# Patient Record
Sex: FEMALE | Race: ASIAN | Employment: UNEMPLOYED | URBAN - METROPOLITAN AREA
[De-identification: names, ages, dates, MRNs, and addresses within clinical notes are randomized per-mention and may not be internally consistent; named-entity substitution may affect disease eponyms.]

---

## 2017-07-26 RX ORDER — ASPIRIN 81 MG/1
81 TABLET ORAL DAILY
COMMUNITY

## 2017-07-27 ENCOUNTER — HOSPITAL ENCOUNTER (OUTPATIENT)
Facility: HOSPITAL | Age: 57
Setting detail: HOSPITAL OUTPATIENT SURGERY
Discharge: HOME | End: 2017-07-27
Attending: ORTHOPAEDIC SURGERY | Admitting: ORTHOPAEDIC SURGERY
Payer: MEDICAID

## 2017-07-27 ENCOUNTER — SURGERY (OUTPATIENT)
Age: 57
End: 2017-07-27

## 2017-07-27 VITALS
OXYGEN SATURATION: 98 % | BODY MASS INDEX: 23.47 KG/M2 | TEMPERATURE: 99 F | HEART RATE: 73 BPM | DIASTOLIC BLOOD PRESSURE: 87 MMHG | RESPIRATION RATE: 14 BRPM | HEIGHT: 63.5 IN | WEIGHT: 134.13 LBS | SYSTOLIC BLOOD PRESSURE: 151 MMHG

## 2017-07-27 PROCEDURE — 88305 TISSUE EXAM BY PATHOLOGIST: CPT | Performed by: ORTHOPAEDIC SURGERY

## 2017-07-27 PROCEDURE — 0JBJ0ZZ EXCISION OF RIGHT HAND SUBCUTANEOUS TISSUE AND FASCIA, OPEN APPROACH: ICD-10-PCS | Performed by: ORTHOPAEDIC SURGERY

## 2017-07-27 PROCEDURE — 3E0T3BZ INTRODUCTION OF ANESTHETIC AGENT INTO PERIPHERAL NERVES AND PLEXI, PERCUTANEOUS APPROACH: ICD-10-PCS | Performed by: ORTHOPAEDIC SURGERY

## 2017-07-27 PROCEDURE — 0HQFXZZ REPAIR RIGHT HAND SKIN, EXTERNAL APPROACH: ICD-10-PCS | Performed by: ORTHOPAEDIC SURGERY

## 2017-07-27 RX ORDER — BUPIVACAINE HYDROCHLORIDE 5 MG/ML
INJECTION, SOLUTION EPIDURAL; INTRACAUDAL AS NEEDED
Status: DISCONTINUED | OUTPATIENT
Start: 2017-07-27 | End: 2017-07-27 | Stop reason: HOSPADM

## 2017-07-27 RX ORDER — ACETAMINOPHEN AND CODEINE PHOSPHATE 300; 30 MG/1; MG/1
1-2 TABLET ORAL EVERY 6 HOURS PRN
Qty: 8 TABLET | Refills: 1 | Status: SHIPPED | OUTPATIENT
Start: 2017-07-27 | End: 2019-08-07 | Stop reason: ALTCHOICE

## 2017-07-27 NOTE — BRIEF OP NOTE
Pre-Operative Diagnosis: RIGHT HAND MASS     Post-Operative Diagnosis: RIGHT HAND MASS     Procedure Performed:   Procedure(s):  RIGHT HAND MASS EXCISION    Surgeon(s) and Role:     Gerda Gonzalez MD - Primary    Assistant(s):        Surgical Findings

## 2017-07-27 NOTE — H&P
[address] Patient Status:  [facility]    1960 MRN [id number]   Location [address] [place] [address] Attending Tiffanie Kang MD   Hosp Day # 0 PCP Isabel Holbrook MD approximately 3mm and react to 2mm with reaction to light. Oropharynx is clear. Neck: No tenderness to palpitation. Full range of motion to flexion and extension, lateral rotation and lateral flexion of cervical spine. No JVD. Supple.    Lungs: Clear to

## 2017-07-28 NOTE — OPERATIVE REPORT
[address]    PATIENT'S NAME: Rosalie Delbarton   ATTENDING PHYSICIAN: William Santos M.D. OPERATING PHYSICIAN: William Santos M.D.    PATIENT ACCOUNT#:   [de-identified]    LOCATION:  PREOPASCC  PRE ASCC 15 EDWP 10  MEDICAL RECORD #:   [id number]       RYLEY site, and procedure were verified. The mass and an ellipse that was oriented in the radial to ulnar essentially transverse orientation was performed. The mass did not appear to extend deep to the dermal tissue.   There is no involvement of the flexor tend

## 2017-08-07 PROBLEM — R22.31 MASS OF FINGER OF RIGHT HAND: Status: ACTIVE | Noted: 2017-08-07

## 2018-11-05 PROBLEM — M65.331 TRIGGER MIDDLE FINGER OF RIGHT HAND: Status: ACTIVE | Noted: 2018-11-05

## 2019-02-16 ENCOUNTER — TELEPHONE (OUTPATIENT)
Dept: OPHTHALMOLOGY | Age: 59
End: 2019-02-16

## 2019-02-20 ENCOUNTER — OFFICE VISIT (OUTPATIENT)
Dept: OPHTHALMOLOGY | Age: 59
End: 2019-02-20

## 2019-02-20 DIAGNOSIS — H11.31 SUBCONJUNCTIVAL HEMORRHAGE OF RIGHT EYE: Primary | ICD-10-CM

## 2019-02-20 DIAGNOSIS — H04.123 DRY EYE SYNDROME, BILATERAL: ICD-10-CM

## 2019-02-20 PROCEDURE — 99202 OFFICE O/P NEW SF 15 MIN: CPT | Performed by: OPHTHALMOLOGY

## 2019-02-20 RX ORDER — ASPIRIN 81 MG/1
81 TABLET ORAL
COMMUNITY

## 2019-02-20 RX ORDER — CITALOPRAM 20 MG/1
20 TABLET ORAL
COMMUNITY

## 2019-08-22 ENCOUNTER — HOSPITAL ENCOUNTER (OUTPATIENT)
Facility: HOSPITAL | Age: 59
Setting detail: HOSPITAL OUTPATIENT SURGERY
Discharge: HOME | End: 2019-08-22
Attending: ORTHOPAEDIC SURGERY | Admitting: ORTHOPAEDIC SURGERY
Payer: MEDICAID

## 2019-08-22 VITALS
BODY MASS INDEX: 25.15 KG/M2 | HEART RATE: 68 BPM | HEIGHT: 62 IN | SYSTOLIC BLOOD PRESSURE: 146 MMHG | TEMPERATURE: 98 F | OXYGEN SATURATION: 99 % | RESPIRATION RATE: 16 BRPM | DIASTOLIC BLOOD PRESSURE: 86 MMHG | WEIGHT: 136.69 LBS

## 2019-08-22 DIAGNOSIS — M65.331 TRIGGER MIDDLE FINGER OF RIGHT HAND: ICD-10-CM

## 2019-08-22 PROCEDURE — 0LN70ZZ RELEASE RIGHT HAND TENDON, OPEN APPROACH: ICD-10-PCS | Performed by: ORTHOPAEDIC SURGERY

## 2019-08-22 RX ORDER — ACETAMINOPHEN AND CODEINE PHOSPHATE 300; 30 MG/1; MG/1
1-2 TABLET ORAL EVERY 6 HOURS PRN
Qty: 8 TABLET | Refills: 1 | Status: SHIPPED | OUTPATIENT
Start: 2019-08-22

## 2019-08-22 RX ORDER — LIDOCAINE HYDROCHLORIDE AND EPINEPHRINE 10; 10 MG/ML; UG/ML
INJECTION, SOLUTION INFILTRATION; PERINEURAL AS NEEDED
Status: DISCONTINUED | OUTPATIENT
Start: 2019-08-22 | End: 2019-08-22 | Stop reason: HOSPADM

## 2019-08-22 NOTE — OPERATIVE REPORT
[facility]    PATIENT'S NAME: Hayden Zhong   ATTENDING PHYSICIAN: Perez Nelson M.D. OPERATING PHYSICIAN: Perez Nelson M.D.    PATIENT ACCOUNT#:   [de-identified]    LOCATION:  [address] [place] [address] 10  MEDICAL RECORD #:   [id number]       DA full-thickness skin flaps were raised. The tissue proximal to the A1 pulley was divided longitudinally and the A1 pulley was then divided under direct visualization in a proximal to distal fashion.   Following this, the patient could fully flex and fully e

## 2019-08-22 NOTE — H&P
[address] [facility] Status:  [facility]    1960 MRN [id number]   Location [address] [place] [address] Attending Niles Crigler, MD   Hosp Day # 0 PCP Mahin Nunes MD     Histor extension, lateral rotation and lateral flexion of cervical spine. No JVD. Supple. Lungs: Clear to auscultation bilaterally. Cardiac: Regular rate and rhythm. No murmur. Abdomen:  Soft, non-distended, non-tender, with no rebound or guarding.   No perit

## 2019-08-22 NOTE — BRIEF OP NOTE
Pre-Operative Diagnosis: Trigger middle finger of right hand [M65.331]     Post-Operative Diagnosis: Trigger middle finger of right hand [M65.331]      Procedure Performed:   Procedure(s):  RIGHT MIDDLE FINGER TRIGGER RELEASE    Surgeon(s) and Role:     *

## 2022-05-19 ENCOUNTER — HOSPITAL ENCOUNTER (OUTPATIENT)
Facility: HOSPITAL | Age: 62
Setting detail: HOSPITAL OUTPATIENT SURGERY
Discharge: HOME | End: 2022-05-19
Attending: ORTHOPAEDIC SURGERY | Admitting: ORTHOPAEDIC SURGERY
Payer: MEDICAID

## 2022-05-19 VITALS
BODY MASS INDEX: 24.97 KG/M2 | SYSTOLIC BLOOD PRESSURE: 168 MMHG | RESPIRATION RATE: 16 BRPM | HEIGHT: 61 IN | OXYGEN SATURATION: 98 % | WEIGHT: 132.25 LBS | TEMPERATURE: 99 F | HEART RATE: 69 BPM | DIASTOLIC BLOOD PRESSURE: 97 MMHG

## 2022-05-19 DIAGNOSIS — Z01.812 ENCOUNTER FOR PREOPERATIVE SCREENING LABORATORY TESTING FOR COVID-19 VIRUS: Primary | ICD-10-CM

## 2022-05-19 DIAGNOSIS — Z20.822 ENCOUNTER FOR PREOPERATIVE SCREENING LABORATORY TESTING FOR COVID-19 VIRUS: Primary | ICD-10-CM

## 2022-05-19 DIAGNOSIS — M65.311 TRIGGER THUMB OF RIGHT HAND: ICD-10-CM

## 2022-05-19 LAB — SARS-COV-2 RNA RESP QL NAA+PROBE: NOT DETECTED

## 2022-05-19 PROCEDURE — 0LN70ZZ RELEASE RIGHT HAND TENDON, OPEN APPROACH: ICD-10-PCS | Performed by: ORTHOPAEDIC SURGERY

## 2022-05-19 RX ORDER — LIDOCAINE HYDROCHLORIDE AND EPINEPHRINE 10; 10 MG/ML; UG/ML
INJECTION, SOLUTION INFILTRATION; PERINEURAL AS NEEDED
Status: DISCONTINUED | OUTPATIENT
Start: 2022-05-19 | End: 2022-05-19 | Stop reason: HOSPADM

## 2022-05-19 NOTE — BRIEF OP NOTE
Pre-Operative Diagnosis: Trigger thumb of right hand [M65.311]     Post-Operative Diagnosis: Trigger thumb of right hand [M65.311]      Procedure Performed:   RIGHT TRIGGER THUMB RELEASE    Surgeon(s) and Role:     Milton Greene MD - Primary    Assistant(s):        Surgical Findings: c/w dx     Specimen: none     Estimated Blood Loss: No data recorded    Dictation Number:       Filomena Hyde MD  5/19/2022  2:10 PM

## 2022-05-20 NOTE — OPERATIVE REPORT
[address]    PATIENT'S NAME: Isatu Cano   ATTENDING PHYSICIAN: Sidra Avelar M.D. OPERATING PHYSICIAN: Sidra Avelar M.D. PATIENT ACCOUNT#:   [de-identified]    LOCATION:  [address] [place] [address]  MEDICAL RECORD #:   [id number]       YOB: 1960  ADMISSION DATE:       05/19/2022      OPERATION DATE:  05/19/2022    OPERATIVE REPORT    PREOPERATIVE DIAGNOSIS:  Right trigger thumb. POSTOPERATIVE DIAGNOSIS:  Right trigger thumb. PROCEDURE:  Right trigger thumb release. ANESTHESIA:  Local.    ESTIMATED BLOOD LOSS:  Minimal.     TOURNIQUET TIME:  Zero. SPECIMENS:  None. COMPLICATIONS:  None. DISPOSITION:  Fair condition to recovery room. PLAN:  Patient can begin immediate range of motion. INDICATIONS:  The patient is a 78-year-old female with history of right trigger thumb. She continued to have some difficulty despite cortisone injections. She was therefore offered surgical intervention. The risks and benefits of the procedure were discussed in detail with the patient, including risk of stiffness, chronic pain, skin healing problems, infection. She shows good understanding of these issues and wished to proceed with surgery. FINDINGS:  Consistent with diagnosis. OPERATIVE TECHNIQUE:  On the day of operation, I saw the patient in the holding room, initialed the surgical site. The patient was taken to the operating room and was placed supine on the OR table. A local block using lidocaine with epinephrine was performed. The left upper extremity was prepped and draped in standard surgical fashion. Surgical time-out was taken which the proper patient, surgical site, and procedure were verified. An incision in the proximal crease of the thumb was performed. Dissection was carried down through soft tissue and full-thickness skin flaps were raised. The flexor tendon sheath was visualized.   The A1 pulley was divided in a proximal to distal fashion under direct visualization. Following this, the patient could fully flex and fully extend the thumb. The wound was copiously irrigated and skin flaps were closed with horizontal mattress 5-0 nylon suture. Sterile bulky dressings were applied. The patient was taken to recovery room in fair condition. She was given postop written and oral instructions as well as oral narcotics for postop pain. She can follow up in 1 week for a wound check.       Dictated By Tesfaye Braun M.D.  d: 05/19/2022 14:52:14  t: 05/19/2022 21:14:21  [facility] [id number]  OFELIA/

## (undated) DEVICE — SUT ETHILON 5-0 PS-3 1668H

## (undated) DEVICE — GLOVE SURG TRIUMPH SZ 71/2

## (undated) DEVICE — STRETCH BANDAGE ROLL: Brand: DERMACEA

## (undated) DEVICE — PADDING CAST SOFT ROLL 2\" STER

## (undated) DEVICE — STERILE POLYISOPRENE POWDER-FREE SURGICAL GLOVES: Brand: PROTEXIS

## (undated) DEVICE — MEGADYNE ELECTRODE ADULT PT RT

## (undated) DEVICE — STANDARD HYPODERMIC NEEDLE,POLYPROPYLENE HUB: Brand: MONOJECT

## (undated) DEVICE — GOWN AERO CHROME XXL

## (undated) DEVICE — UPPER EXTREMITY CDS-LF: Brand: MEDLINE INDUSTRIES, INC.

## (undated) DEVICE — SOLUTION  .9 1000ML BTL

## (undated) DEVICE — REM POLYHESIVE ADULT PATIENT RETURN ELECTRODE: Brand: VALLEYLAB

## (undated) DEVICE — KENDALL SCD EXPRESS SLEEVES, KNEE LENGTH, MEDIUM: Brand: KENDALL SCD

## (undated) DEVICE — ZIMMER® STERILE DISPOSABLE TOURNIQUET CUFF WITH PLC, DUAL PORT, DUAL BLADDER, 18 IN. (46 CM)

## (undated) DEVICE — GAUZE SPONGES,12 PLY: Brand: CURITY

## (undated) DEVICE — DISPOSABLE TOURNIQUET CUFF DUAL BLADDER, DUAL PORT AND QUICK CONNECT CONNECTOR: Brand: COLOR CUFF

## (undated) DEVICE — GAUZE SPONGES,USP TYPE VII GAUZE, 12 PLY: Brand: CURITY

## (undated) DEVICE — 3M(TM) STERI-STRIP(TM) ANTIMICROBIAL SKIN CLOSURES (REINFORCED) A1846: Brand: 3M™ STERI-STRIP™

## (undated) DEVICE — SLEEVE KENDALL SCD EXPRESS MED

## (undated) DEVICE — GAMMEX® PI HYBRID SIZE 7.5, STERILE POWDER-FREE SURGICAL GLOVE, POLYISOPRENE AND NEOPRENE BLEND: Brand: GAMMEX

## (undated) DEVICE — PADDING CAST WYTEX 2\" STER

## (undated) DEVICE — GLOVE BIOGEL M SURG SZ 8

## (undated) DEVICE — 1010 S-DRAPE TOWEL DRAPE 10/BX: Brand: STERI-DRAPE™

## (undated) DEVICE — GOWN SURG AERO CHROME XXL

## (undated) DEVICE — SOL  .9 1000ML BTL

## (undated) DEVICE — OCCLUSIVE GAUZE STRIP OVERWRAP,3% BISMUTH TRIBROMOPHENATE IN PETROLATUM BLEND: Brand: XEROFORM

## (undated) DEVICE — GAMMEX® PI HYBRID SIZE 8, STERILE POWDER-FREE SURGICAL GLOVE, POLYISOPRENE AND NEOPRENE BLEND: Brand: GAMMEX

## (undated) DEVICE — SUTURE ETHILON 5-0 PS-3

## (undated) DEVICE — PADDING CAST COTTON STER 3

## (undated) DEVICE — CONVERTORS STOCKINETTE: Brand: CONVERTORS

## (undated) DEVICE — SUTURE ETHILON 4-0 P-3

## (undated) DEVICE — PADDING CAST SOFT ROLL 2" STER

## (undated) DEVICE — 3M™ STERI-STRIP™ COMPOUND BENZOIN TINCTURE 40 BAGS/CARTON 4 CARTONS/CASE C1544: Brand: 3M™ STERI-STRIP™

## (undated) NOTE — LETTER
[facility]  [place] [address] [facility], [address] [place] [address]    Consent for Operation    Date: __________________    Time: _______________    1.  I authorize the performance upon Khari Watts the following operation:    Procedure(s):  RIGHT HAND MASS EXC videotape. [facility] will not be responsible for storage or maintenance of this tape. 6. For the purpose of advancing medical education, I consent to the admittance of observers to the Operating Room.     7. I authorize the use of any specimen, organs Signature of Patient:   ___________________________    When the patient is a minor or mentally incompetent to give consent:  Signature of person authorized to consent for patient: ___________________________   Relationship to patient: _____________________ drugs/illegal medications). Failure to inform my anesthesiologist about these medicines may increase my risk of anesthetic complications. · If I am allergic to anything or have had a reaction to anesthesia before.     3. I understand how the anesthesia med I have discussed the procedure and information above with the patient (or patient’s representative) and answered their questions. The patient or their representative has agreed to have anesthesia services.     _______________________________________________